# Patient Record
Sex: MALE | Race: WHITE | NOT HISPANIC OR LATINO | Employment: OTHER | URBAN - METROPOLITAN AREA
[De-identification: names, ages, dates, MRNs, and addresses within clinical notes are randomized per-mention and may not be internally consistent; named-entity substitution may affect disease eponyms.]

---

## 2022-10-17 DIAGNOSIS — N18.9 ANEMIA OF CHRONIC RENAL FAILURE, UNSPECIFIED CKD STAGE: ICD-10-CM

## 2022-10-17 DIAGNOSIS — D63.1 ANEMIA OF CHRONIC RENAL FAILURE, UNSPECIFIED CKD STAGE: ICD-10-CM

## 2022-10-17 DIAGNOSIS — N18.4 CHRONIC KIDNEY DISEASE, STAGE IV (SEVERE): Primary | ICD-10-CM

## 2022-10-17 DIAGNOSIS — N25.81 SECONDARY HYPERPARATHYROIDISM OF RENAL ORIGIN: ICD-10-CM

## 2022-10-21 RX ORDER — MIDODRINE HYDROCHLORIDE 5 MG/1
10 TABLET ORAL 2 TIMES DAILY
COMMUNITY
Start: 2022-09-21

## 2022-10-21 RX ORDER — LATANOPROST 50 UG/ML
1 SOLUTION/ DROPS OPHTHALMIC NIGHTLY
COMMUNITY

## 2022-10-21 RX ORDER — MEMANTINE HYDROCHLORIDE 5 MG/1
5 TABLET ORAL NIGHTLY
COMMUNITY
Start: 2022-08-17

## 2022-10-21 RX ORDER — INSULIN GLARGINE 300 [IU]/ML
100 INJECTION, SOLUTION SUBCUTANEOUS DAILY
COMMUNITY
Start: 2022-06-01

## 2022-10-21 RX ORDER — TAMSULOSIN HYDROCHLORIDE 0.4 MG/1
1 CAPSULE ORAL DAILY
COMMUNITY
Start: 2022-05-02 | End: 2023-06-07 | Stop reason: ALTCHOICE

## 2022-10-21 RX ORDER — ATORVASTATIN CALCIUM 40 MG/1
1 TABLET, FILM COATED ORAL NIGHTLY
COMMUNITY
Start: 2022-08-03

## 2022-10-21 RX ORDER — ATORVASTATIN CALCIUM 40 MG/1
40 TABLET, FILM COATED ORAL DAILY
COMMUNITY
Start: 2022-08-03 | End: 2023-06-07 | Stop reason: SDUPTHER

## 2022-10-21 RX ORDER — WARFARIN SODIUM 5 MG/1
1 TABLET ORAL DAILY
COMMUNITY
Start: 2021-10-27

## 2022-10-21 RX ORDER — ASPIRIN 81 MG/1
81 TABLET ORAL DAILY
COMMUNITY

## 2022-10-21 RX ORDER — AMIODARONE HYDROCHLORIDE 200 MG/1
200 TABLET ORAL DAILY
COMMUNITY
Start: 2022-10-09

## 2022-10-21 RX ORDER — LEVOTHYROXINE SODIUM 100 UG/1
100 TABLET ORAL DAILY
COMMUNITY
Start: 2022-01-12

## 2022-10-31 ENCOUNTER — OFFICE VISIT (OUTPATIENT)
Dept: NEPHROLOGY | Facility: CLINIC | Age: 82
End: 2022-10-31
Payer: MEDICARE

## 2022-10-31 VITALS
WEIGHT: 176.88 LBS | HEIGHT: 68 IN | HEART RATE: 69 BPM | SYSTOLIC BLOOD PRESSURE: 127 MMHG | BODY MASS INDEX: 26.81 KG/M2 | DIASTOLIC BLOOD PRESSURE: 58 MMHG | OXYGEN SATURATION: 99 %

## 2022-10-31 DIAGNOSIS — N18.32 STAGE 3B CHRONIC KIDNEY DISEASE: Primary | ICD-10-CM

## 2022-10-31 DIAGNOSIS — E83.52 HYPERCALCEMIA: ICD-10-CM

## 2022-10-31 PROCEDURE — 99212 OFFICE O/P EST SF 10 MIN: CPT | Mod: S$GLB,,, | Performed by: INTERNAL MEDICINE

## 2022-10-31 PROCEDURE — 99212 PR OFFICE/OUTPT VISIT, EST, LEVL II, 10-19 MIN: ICD-10-PCS | Mod: S$GLB,,, | Performed by: INTERNAL MEDICINE

## 2022-10-31 RX ORDER — WARFARIN 2.5 MG/1
2.5 TABLET ORAL DAILY
COMMUNITY

## 2022-10-31 NOTE — PROGRESS NOTES
Pt Name:  Danny Ramey Sr.  Pt :  1940  Pt MRN:  [id number]    Date: 10/31/2022  Provider: Sarita Lance    Reason for visit: seeing for ckd.      Chief Complaint:   Chief Complaint   Patient presents with    Chronic Kidney Disease     Stage 4 cr 2.38 GFR 24    Follow-up     6 month       HPI:  [unfilled]  No complaints.    History:   Past Medical History:   Diagnosis Date    Arrhythmia     Atrial fibrillation     CAD (coronary artery disease)     Carotid artery disease     CKD (chronic kidney disease)     Diabetes mellitus, type 2     Glaucoma     Heart murmur     HLD (hyperlipidemia)     HTN (hypertension)     JUSTO (obstructive sleep apnea)     PVD (peripheral vascular disease)     Sick sinus syndrome      Past Surgical History:   Procedure Laterality Date    APPENDECTOMY      BACK SURGERY      CARDIAC CATHETERIZATION      CARDIAC PACEMAKER PLACEMENT      CAROTID ENDARTERECTOMY Bilateral     CATARACT EXTRACTION      COLONOSCOPY      CORONARY ANGIOPLASTY WITH STENT PLACEMENT      CORONARY ARTERY BYPASS GRAFT  2000    ESOPHAGOGASTRODUODENOSCOPY      HERNIA REPAIR      SHOULDER SURGERY Bilateral      Family History   Problem Relation Age of Onset    Brain cancer Mother     Stroke Father     Deep vein thrombosis Father     Breast cancer Sister     Diabetes Brother     Prostate cancer Brother      Social History     Substance and Sexual Activity   Alcohol Use Never     Social History     Substance and Sexual Activity   Drug Use Never     Social History     Substance and Sexual Activity   Sexual Activity Not Currently    Partners: Female     reports that he is not currently sexually active and has had partner(s) who are female.  Social History     Tobacco Use   Smoking Status Former    Packs/day: 3.00    Years: 30.00    Pack years: 90.00    Types: Cigarettes    Quit date: 3/18/1988    Years since quittin.6   Smokeless Tobacco Never       Allergies:  Review of patient's allergies indicates:  "  Allergen Reactions    Ticagrelor Shortness Of Breath       [unfilled]    ROS:   Constitutional:  Denies fever or chills   Eyes:  Denies change in visual acuity   HENT:  Denies nasal congestion or sore throat   Respiratory:  As in the history of the present illness.   Cardiovascular:  As in the history of the present illness.   GI:  As in the history of the present illness.    Musculoskeletal:  Denies back pain or joint pain   Integument:  Denies rash   Neurologic:  Denies headache, focal weakness or sensory changes   Endocrine:  Denies polyuria or polydipsia   Lymphatic:  Denies swollen glands   Psychiatric:  Denies depression or anxiety    Physical Exam:   Vitals:   Vitals:    10/31/22 1429   BP: (!) 127/58   Pulse: 69   SpO2: 99%   Weight: 80.2 kg (176 lb 14.4 oz)   Height: 5' 8" (1.727 m)   PainSc: 0-No pain     Body mass index is 26.9 kg/m².    Constitutional:  Well developed, well nourished, and in no acute distress   Eyes:  PERRLA, conjunctiva normal   HENT:  Atraumatic, external ears normal, nose normal.  Neck: There is no jugular venous distension or thyromegaly.   Respiratory:  No respiratory distress, normal breath sounds, no rales, no wheezing   Cardiovascular:  Normal rate, and a regular rhythm, no murmurs, no gallops, no rub, and no edema.  GI:  Normal bowel sounds.  Musculoskeletal:  No deformities.   Neurologic:  Alert & oriented x 3, CN 2-12 normal, normal motor function, and no asterixis.   Psychiatric:  Speech and behavior appropriate.    Labs/Tests:  Lab Results   Component Value Date     10/21/2022    K 5.8 (H) 10/21/2022     10/21/2022    CO2 26 10/21/2022    BUN 57 (H) 10/21/2022    CREATININE 2.38 (H) 10/21/2022    CREATININE 2.17 (H) 10/10/2022    CREATININE 1.79 (H) 07/05/2022    GLUCOSE Negative 10/21/2022    GLUCOSE 71 10/21/2022    CALCIUM 11.0 (H) 10/21/2022    PHOSPHORUS 4.1 10/21/2022    ALBUMIN 4.4 10/21/2022    EGFRNONAA 24 (L) 10/21/2022    EGFRNONAA 27 (L) 10/10/2022 "    EGFRNONAA 34 (L) 07/05/2022    ESTGFRAFRICA 28 (L) 10/21/2022    ESTGFRAFRICA 31 (L) 10/10/2022    ESTGFRAFRICA 39 (L) 07/05/2022    PTH 75 (H) 10/21/2022    HGB 13.0 10/21/2022    HGB 13.0 10/21/2022    HGB 13.7 10/10/2022    TRIG 110 10/10/2022    CHOL 153 10/10/2022    HDL 52 10/10/2022    LDLCALC 80 10/10/2022    LABVLDL 22 10/10/2022       Assessment & Plan:    Visit Diagnosis:   [unfilled]  [unfilled]    Problem List: There is no problem list on file for this patient.    Ckd 3b - labs worse and ca++ is high. 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    Follow Up:   Follow up in about 2 weeks (around 11/14/2022).

## 2022-11-23 ENCOUNTER — OFFICE VISIT (OUTPATIENT)
Dept: NEPHROLOGY | Facility: CLINIC | Age: 82
End: 2022-11-23
Payer: MEDICARE

## 2022-11-23 VITALS
DIASTOLIC BLOOD PRESSURE: 58 MMHG | SYSTOLIC BLOOD PRESSURE: 129 MMHG | OXYGEN SATURATION: 96 % | HEART RATE: 70 BPM | HEIGHT: 68 IN | WEIGHT: 178.5 LBS | BODY MASS INDEX: 27.05 KG/M2

## 2022-11-23 DIAGNOSIS — Z79.4 TYPE 2 DIABETES MELLITUS WITH STAGE 3B CHRONIC KIDNEY DISEASE, WITH LONG-TERM CURRENT USE OF INSULIN: ICD-10-CM

## 2022-11-23 DIAGNOSIS — E11.22 TYPE 2 DIABETES MELLITUS WITH STAGE 3B CHRONIC KIDNEY DISEASE, WITH LONG-TERM CURRENT USE OF INSULIN: ICD-10-CM

## 2022-11-23 DIAGNOSIS — N25.81 SECONDARY HYPERPARATHYROIDISM OF RENAL ORIGIN: ICD-10-CM

## 2022-11-23 DIAGNOSIS — I10 PRIMARY HYPERTENSION: ICD-10-CM

## 2022-11-23 DIAGNOSIS — N18.32 TYPE 2 DIABETES MELLITUS WITH STAGE 3B CHRONIC KIDNEY DISEASE, WITH LONG-TERM CURRENT USE OF INSULIN: ICD-10-CM

## 2022-11-23 DIAGNOSIS — N18.32 STAGE 3B CHRONIC KIDNEY DISEASE: Primary | ICD-10-CM

## 2022-11-23 PROCEDURE — 99213 PR OFFICE/OUTPT VISIT, EST, LEVL III, 20-29 MIN: ICD-10-PCS | Mod: S$GLB,,, | Performed by: INTERNAL MEDICINE

## 2022-11-23 PROCEDURE — 99213 OFFICE O/P EST LOW 20 MIN: CPT | Mod: S$GLB,,, | Performed by: INTERNAL MEDICINE

## 2022-11-23 NOTE — PROGRESS NOTES
Pt Name:  Danny Ramey Sr.  Pt :  1940  Pt MRN:  [id number]    Date: 2022  Provider: Sarita Lance    Reason for visit:   Seeing for ckd.    Chief Complaint:   Chief Complaint   Patient presents with    Chronic Kidney Disease     Stage 3 cr 1.95 GFR 31    Follow-up     2 week       HPI:  [unfilled]  No complaints.    History:   Past Medical History:   Diagnosis Date    Arrhythmia     Atrial fibrillation     CAD (coronary artery disease)     Carotid artery disease     CKD (chronic kidney disease)     Diabetes mellitus, type 2     Glaucoma     Heart murmur     HLD (hyperlipidemia)     HTN (hypertension)     JUSTO (obstructive sleep apnea)     PVD (peripheral vascular disease)     Sick sinus syndrome      Past Surgical History:   Procedure Laterality Date    APPENDECTOMY      BACK SURGERY      CARDIAC CATHETERIZATION      CARDIAC PACEMAKER PLACEMENT      CAROTID ENDARTERECTOMY Bilateral     CATARACT EXTRACTION      COLONOSCOPY      CORONARY ANGIOPLASTY WITH STENT PLACEMENT      CORONARY ARTERY BYPASS GRAFT  2000    ESOPHAGOGASTRODUODENOSCOPY      HERNIA REPAIR      SHOULDER SURGERY Bilateral      Family History   Problem Relation Age of Onset    Brain cancer Mother     Stroke Father     Deep vein thrombosis Father     Breast cancer Sister     Diabetes Brother     Prostate cancer Brother      Social History     Substance and Sexual Activity   Alcohol Use Never     Social History     Substance and Sexual Activity   Drug Use Never     Social History     Substance and Sexual Activity   Sexual Activity Not Currently    Partners: Female     reports that he is not currently sexually active and has had partner(s) who are female.  Social History     Tobacco Use   Smoking Status Former    Packs/day: 3.00    Years: 30.00    Pack years: 90.00    Types: Cigarettes    Quit date: 3/18/1988    Years since quittin.7   Smokeless Tobacco Never       Allergies:  Review of patient's allergies indicates:  "  Allergen Reactions    Ticagrelor Shortness Of Breath       [unfilled]    ROS:   Constitutional:  Denies fever or chills   Eyes:  Denies change in visual acuity   HENT:  Denies nasal congestion or sore throat   Respiratory:  As in the history of the present illness.   Cardiovascular:  As in the history of the present illness.   GI:  As in the history of the present illness.    Musculoskeletal:  Denies back pain or joint pain   Integument:  Denies rash   Neurologic:  Denies headache, focal weakness or sensory changes   Endocrine:  Denies polyuria or polydipsia   Lymphatic:  Denies swollen glands   Psychiatric:  Denies depression or anxiety    Physical Exam:   Vitals:   Vitals:    11/23/22 0908   BP: (!) 129/58   Pulse: 70   SpO2: 96%   Weight: 81 kg (178 lb 8 oz)   Height: 5' 8" (1.727 m)   PainSc: 0-No pain     Body mass index is 27.14 kg/m².    Constitutional:  Well developed, well nourished, and in no acute distress   Eyes:  PERRLA, conjunctiva normal   HENT:  Atraumatic, external ears normal, nose normal.  Neck: There is no jugular venous distension or thyromegaly.   Respiratory:  No respiratory distress, normal breath sounds, no rales, no wheezing   Cardiovascular:  Normal rate, and a regular rhythm, no murmurs, no gallops, no rub, and no edema.  GI:  Normal bowel sounds.  Musculoskeletal:  No deformities.   Neurologic:  Alert & oriented x 3, CN 2-12 normal, normal motor function, and no asterixis.   Psychiatric:  Speech and behavior appropriate.    Labs/Tests:  Lab Results   Component Value Date     11/11/2022    K 4.5 11/11/2022     11/11/2022    CO2 30 11/11/2022    BUN 43 (H) 11/11/2022    CREATININE 1.95 (H) 11/11/2022    CREATININE 2.38 (H) 10/21/2022    CREATININE 2.17 (H) 10/10/2022    GLUCOSE 98 11/11/2022    CALCIUM 10.4 11/11/2022    PHOSPHORUS 4.1 11/11/2022    ALBUMIN 4.6 11/11/2022    ALBUMIN 3.85 11/11/2022    EGFRNONAA 31 (L) 11/11/2022    EGFRNONAA 24 (L) 10/21/2022    EGFRNONAA 27 " (L) 10/10/2022    ESTGFRAFRICA 35 (L) 11/11/2022    ESTGFRAFRICA 28 (L) 10/21/2022    ESTGFRAFRICA 31 (L) 10/10/2022    PTH 75 (H) 10/21/2022    HGB 13.0 10/21/2022    HGB 13.0 10/21/2022    HGB 13.7 10/10/2022    TRIG 110 10/10/2022    CHOL 153 10/10/2022    HDL 52 10/10/2022    LDLCALC 80 10/10/2022    LABVLDL 22 10/10/2022       Assessment & Plan:    Visit Diagnosis:   [unfilled]  [unfilled]    Problem List: There is no problem list on file for this patient.    Ckdb - cr back to baseline.    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    Follow Up:   No follow-ups on file.

## 2023-06-07 ENCOUNTER — OFFICE VISIT (OUTPATIENT)
Dept: NEPHROLOGY | Facility: CLINIC | Age: 83
End: 2023-06-07
Payer: MEDICARE

## 2023-06-07 VITALS
BODY MASS INDEX: 25.91 KG/M2 | SYSTOLIC BLOOD PRESSURE: 126 MMHG | DIASTOLIC BLOOD PRESSURE: 55 MMHG | WEIGHT: 171 LBS | OXYGEN SATURATION: 97 % | HEART RATE: 68 BPM | HEIGHT: 68 IN

## 2023-06-07 DIAGNOSIS — N18.32 STAGE 3B CHRONIC KIDNEY DISEASE: Primary | ICD-10-CM

## 2023-06-07 DIAGNOSIS — Z79.4 TYPE 2 DIABETES MELLITUS WITH STAGE 3B CHRONIC KIDNEY DISEASE, WITH LONG-TERM CURRENT USE OF INSULIN: ICD-10-CM

## 2023-06-07 DIAGNOSIS — E11.22 TYPE 2 DIABETES MELLITUS WITH STAGE 3B CHRONIC KIDNEY DISEASE, WITH LONG-TERM CURRENT USE OF INSULIN: ICD-10-CM

## 2023-06-07 DIAGNOSIS — I10 PRIMARY HYPERTENSION: ICD-10-CM

## 2023-06-07 DIAGNOSIS — N18.32 TYPE 2 DIABETES MELLITUS WITH STAGE 3B CHRONIC KIDNEY DISEASE, WITH LONG-TERM CURRENT USE OF INSULIN: ICD-10-CM

## 2023-06-07 PROCEDURE — 99214 OFFICE O/P EST MOD 30 MIN: CPT | Mod: S$GLB,,, | Performed by: INTERNAL MEDICINE

## 2023-06-07 PROCEDURE — 99214 PR OFFICE/OUTPT VISIT, EST, LEVL IV, 30-39 MIN: ICD-10-PCS | Mod: S$GLB,,, | Performed by: INTERNAL MEDICINE

## 2023-06-07 RX ORDER — FLUDROCORTISONE ACETATE 0.1 MG/1
0.05 TABLET ORAL 2 TIMES DAILY
COMMUNITY
Start: 2022-11-30

## 2023-06-07 RX ORDER — GLIMEPIRIDE 1 MG/1
1 TABLET ORAL DAILY
COMMUNITY
Start: 2023-05-23

## 2023-06-07 NOTE — PROGRESS NOTES
Pt Name:  Danny Ramey Sr.  Pt :  1940  Pt MRN:  [id number]    Date: 2023  Provider: Sarita Lance    Reason for visit:   Seeing for ckd.      Chief Complaint:   Chief Complaint   Patient presents with    Chronic Kidney Disease     Serum creatinine 1.66, GFR 40, CKD stage 3       HPI:  HPI   No complaints. No gi, gu, pulm or card complaints.    History:   Past Medical History:   Diagnosis Date    Arrhythmia     Atrial fibrillation     CAD (coronary artery disease)     Carotid artery disease     CKD (chronic kidney disease)     Diabetes mellitus, type 2     Glaucoma     Heart murmur     HLD (hyperlipidemia)     HTN (hypertension)     JUSTO (obstructive sleep apnea)     PVD (peripheral vascular disease)     Sick sinus syndrome      Past Surgical History:   Procedure Laterality Date    APPENDECTOMY      BACK SURGERY      CARDIAC CATHETERIZATION      CARDIAC PACEMAKER PLACEMENT      CAROTID ENDARTERECTOMY Bilateral     CATARACT EXTRACTION      COLONOSCOPY      CORONARY ANGIOPLASTY WITH STENT PLACEMENT      CORONARY ARTERY BYPASS GRAFT  2000    ESOPHAGOGASTRODUODENOSCOPY      HERNIA REPAIR      SHOULDER SURGERY Bilateral      Family History   Problem Relation Age of Onset    Brain cancer Mother     Stroke Father     Deep vein thrombosis Father     Breast cancer Sister     Diabetes Brother     Prostate cancer Brother      Social History     Substance and Sexual Activity   Alcohol Use Never     Social History     Substance and Sexual Activity   Drug Use Never     Social History     Substance and Sexual Activity   Sexual Activity Not Currently    Partners: Female     reports that he is not currently sexually active and has had partner(s) who are female.  Social History     Tobacco Use   Smoking Status Former    Packs/day: 3.00    Years: 30.00    Pack years: 90.00    Types: Cigarettes    Quit date: 3/18/1988    Years since quittin.2   Smokeless Tobacco Never       Allergies:  Review of patient's  "allergies indicates:   Allergen Reactions    Ticagrelor Shortness Of Breath    Metformin Diarrhea       Current Outpatient Medications   Medication Sig Dispense Refill    amiodarone (PACERONE) 200 MG Tab Take 200 mg by mouth once daily.      aspirin (ECOTRIN) 81 MG EC tablet Take 81 mg by mouth once daily.      atorvastatin (LIPITOR) 40 MG tablet Take 1 tablet by mouth every evening.      fludrocortisone (FLORINEF) 0.1 mg Tab Take 0.05 mg by mouth 2 (two) times a day.      glimepiride (AMARYL) 1 MG tablet Take 1 mg by mouth once daily.      insulin glargine, TOUJEO, (TOUJEO) 300 unit/mL (1.5 mL) InPn pen Inject 100 Units into the skin once daily.      latanoprost 0.005 % ophthalmic solution Apply 1 drop to eye every evening.      levothyroxine (SYNTHROID) 100 MCG tablet Take 100 mcg by mouth once daily.      memantine (NAMENDA) 5 MG Tab Take 5 mg by mouth every evening.      warfarin (COUMADIN) 2.5 MG tablet Take 2.5 mg by mouth once daily. On Sun, Mon, Tues, Thurs, Fri, Sat      warfarin (COUMADIN) 5 MG tablet Take 1 tablet by mouth Daily. On Wednesdays      midodrine (PROAMATINE) 5 MG Tab Take 10 mg by mouth 2 (two) times daily.       No current facility-administered medications for this visit.        ROS:   Constitutional:  Denies fever or chills   Eyes:  Denies change in visual acuity   HENT:  Denies nasal congestion or sore throat   Respiratory:  As in the history of the present illness.   Cardiovascular:  As in the history of the present illness.   GI:  As in the history of the present illness.    Musculoskeletal:  Denies back pain or joint pain   Integument:  Denies rash   Neurologic:  Denies headache, focal weakness or sensory changes   Endocrine:  Denies polyuria or polydipsia   Lymphatic:  Denies swollen glands   Psychiatric:  Denies depression or anxiety    Physical Exam:   Vitals:   Vitals:    06/07/23 1512   BP: (!) 126/55   Pulse: 68   SpO2: 97%   Weight: 77.6 kg (171 lb)   Height: 5' 8" (1.727 m) "   PainSc: 0-No pain     Body mass index is 26 kg/m².    Constitutional:  Well developed, well nourished, and in no acute distress   Eyes:  PERRLA, conjunctiva normal   HENT:  Atraumatic, external ears normal, nose normal.  Neck: There is no jugular venous distension or thyromegaly.   Respiratory:  No respiratory distress, normal breath sounds, no rales, no wheezing   Cardiovascular:  Normal rate, and a regular rhythm, no murmurs, no gallops, no rub, and no edema.  GI:  Normal bowel sounds.  Musculoskeletal:  No deformities.   Neurologic:  Alert & oriented x 3, CN 2-12 normal, normal motor function, and no asterixis.   Psychiatric:  Speech and behavior appropriate.    Labs/Tests:  Lab Results   Component Value Date     11/11/2022    K 4.5 11/11/2022     11/11/2022    CO2 30 11/11/2022    BUN 43 (H) 11/11/2022    CREATININE 1.95 (H) 11/11/2022    CREATININE 2.38 (H) 10/21/2022    CREATININE 2.17 (H) 10/10/2022    GLUCOSE 1+ (A) 05/31/2023    CALCIUM 10.4 11/11/2022    PHOSPHORUS 4.1 11/11/2022    ALBUMIN 4.6 11/11/2022    ALBUMIN 3.85 11/11/2022    EGFRNONAA 31 (L) 11/11/2022    EGFRNONAA 24 (L) 10/21/2022    EGFRNONAA 27 (L) 10/10/2022    ESTGFRAFRICA 35 (L) 11/11/2022    ESTGFRAFRICA 28 (L) 10/21/2022    ESTGFRAFRICA 31 (L) 10/10/2022    PTH 83 (H) 05/31/2023    HGB 13.2 05/31/2023    HGB 13.2 04/24/2023    HGB 13.0 10/21/2022    TRIG 59 04/24/2023    CHOL 131 04/24/2023    HDL 58 04/24/2023    LDLCALC 61 04/24/2023    LABVLDL 12 04/24/2023       Assessment & Plan:    Visit Diagnosis:   1. Stage 3b chronic kidney disease  PTH, Intact    Renal Function Panel    CBC Auto Differential    Urinalysis    PTH, Intact    Renal Function Panel    CBC Auto Differential    Urinalysis      2. Primary hypertension        3. Type 2 diabetes mellitus with stage 3b chronic kidney disease, with long-term current use of insulin           Problem List: There is no problem list on file for this patient.  -  Ckd 3b -  better.    Htn    Dm 2 - no insulin.    1. Stage 3b chronic kidney disease  -     PTH, Intact; Future; Expected date: 06/03/2024  -     Renal Function Panel; Future; Expected date: 06/03/2024  -     CBC Auto Differential; Future; Expected date: 06/03/2024  -     Urinalysis; Future; Expected date: 06/03/2024    2. Primary hypertension    3. Type 2 diabetes mellitus with stage 3b chronic kidney disease, with long-term current use of insulin             Follow Up:   Follow up in about 1 year (around 6/7/2024).